# Patient Record
(demographics unavailable — no encounter records)

---

## 2024-10-28 NOTE — PHYSICAL EXAM
[General Appearance - Alert] : alert [General Appearance - In No Acute Distress] : in no acute distress [Oriented To Time, Place, And Person] : oriented to person, place, and time [Person] : oriented to person [Place] : oriented to place [Time] : oriented to time [Cranial Nerves Optic (II)] : visual acuity intact bilaterally,  pupils equal round and reactive to light [Cranial Nerves Oculomotor (III)] : extraocular motion intact [Cranial Nerves Trigeminal (V)] : facial sensation intact symmetrically [Cranial Nerves Facial (VII)] : face symmetrical [Cranial Nerves Vestibulocochlear (VIII)] : hearing was intact bilaterally [Cranial Nerves Glossopharyngeal (IX)] : tongue and palate midline [Cranial Nerves Accessory (XI - Cranial And Spinal)] : head turning and shoulder shrug symmetric [Cranial Nerves Hypoglossal (XII)] : there was no tongue deviation with protrusion [Motor Tone] : muscle tone was normal in all four extremities [Motor Strength] : muscle strength was normal in all four extremities [Abnormal Walk] : normal gait [Balance] : balance was intact [PERRL With Normal Accommodation] : pupils were equal in size, round, reactive to light, with normal accommodation [Extraocular Movements] : extraocular movements were intact [Affect] : the affect was normal

## 2024-10-29 NOTE — DATA REVIEWED
[de-identified] : Exam: [facility] NEURO IR PROCEDURE 10/18/24 Order#: [id number]    Patient: Abelardo Bello MR#: [id number] : 42 Hospital: [facility]  Pre-operative Diagnosis: Right cerebrovascular dural arteriovenous fistula.  Post-operative Diagnosis: 1. 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 There is additional arterial supply via right middle meningeal artery parietal and petrosquamosal branches, and possibly the right artery to the tentorium arising from the meningohypophyseal trunk. Venous drainage is into the right transverse sinus, transverse-sigmoid junction, and right sigmoid sinus. There is retrograde flow within the dural sinuses without overt evidence for cortical venous reflux. Extracranial venous drainage includes bilateral vertebral venous plexi and hypertrophied intramuscular veins within the suboccipital and posterior cervical musculature.  2. Mild right cervical internal carotid artery stenosis, approximately 32% by NASCET criteria.  PROCEDURE: 1. Diagnostic cerebral angiogram with bilateral common carotid, internal carotid, external carotid, right occipital artery injections. 2. Right common femoral angiogram for evaluation for placement of closure device.  REFERRING PHYSICIAN: Lyndon Britton M.D.  DATE OF SERVICE: 10/18/24  SURGEON: Po Donovan M.D.  ASSISTANT/S: Annie WILSON, George Montero M.D.  Clinical History: ?82 year old male presents with a single self limited episode of diplopia which resolved. Noninvasive vascular imaging studies suggest cerebrovascular dural arteriovenous fistula. Risks of diagnostic angiography including but not limited to bleeding, infection, vascular injury, life threatening hematoma, limb ischemia, renal failure, radiation exposure, stroke/coma/death, weakness/paralysis, visual/sensory loss, loss of speech/language/cognitive/memory function, changes in personality/behavior, failure to diagnose, need for additional procedures were discussed. Patient and family understand risks, benefits, alternatives, and wish to proceed.  Anesthesia: Local anesthesia with intravenous sedation. ? Medications: As per anesthesia.  Techniques and findings: Bilateral groins were prepped and draped in the usual sterile fashion. ?A timeout procedure was documented. The patient's name, date of birth, and medical record number as well as the procedures to be performed were confirmed by the entire team. After everyone in the room agreed, the procedure continued.  The right common femoral artery was accessed with a 4 [place] micropuncture needle after infiltration with local anesthesia. The needle was exchanged for a 5 [place] vascular sheath. The sheath was then connected to a continuous heparinized saline flush.  Diagnostic angiogram: Under fluoroscopic guidance, a 5 [place] GridCraft diagnostic catheter was advanced over a 0.035 inch angled glide guidewire through the abdominal and thoracic aorta into the right common carotid artery.  Right common carotid artery angiograms; cervical view Under fluoroscopic guidance, the catheter was advanced into the right common carotid artery. PA and lateral projections demonstrate antegrade flow into the right common, internal, and external carotid arteries. There is mild segmental stenosis of the proximal right internal carotid artery, distal to the carotid bifurcation, approximately 32% by NASCET criteria. The right cervical internal carotid artery is tortuous.  Right external carotid artery angiogram; cranial view Under fluoroscopic guidance, the catheter was advanced into the right external carotid artery. PA and lateral projections demonstrate a high flow right transverse and sigmoid sinus dural arteriovenous fistula with predominant arterial supply via branches of hypertrophied right occipital and right posterior auricular arteries. Additional arterial supply is noted from distal branches of the parietal and petrosquamosal branches of the right middle meningeal artery. The right superficial temporal artery is not well visualized, likely secondary to vascular steal, and appears late in the arterial phase. Venous drainage is into the right transverse sinus, transverse-sigmoid junction, and right sigmoid sinus. There is retrograde flow within the dural sinuses without overt evidence for cortical venous reflux. Extracranial venous drainage includes bilateral vertebral venous plexi and hypertrophied intramuscular veins within the suboccipital and posterior cervical musculature.  Right occipital artery angiogram; cranial view Under fluoroscopic guidance, the catheter was advanced into the right occipital artery. PA and lateral projections demonstrate the high flow right transverse and sigmoid sinus dural arteriovenous fistula with significant contribution via a hypertrophied occipital artery. Venous drainage is into the right transverse sinus, transverse-sigmoid junction, and right sigmoid sinus. There is retrograde flow within the dural sinuses without overt evidence for cortical venous reflux. Extracranial venous drainage includes bilateral vertebral venous plexi and hypertrophied intramuscular veins within the suboccipital and posterior cervical musculature.  Right internal carotid artery angiogram; cranial view Under fluoroscopic guidance, the catheter was advanced into the right internal carotid artery. PA and lateral projections demonstrate antegrade flow into the intracranial segments of the right i nternal carotid, right middle cerebral, and right anterior cerebral arteries. The right ophthalmic, posterior communicating, and anterior choroidal arteries are visualized. There is no significant cross filling of the contralateral anterior cerebral artery. Capillary phase is unremarkable. Dural venous sinuses are patent. There may be additional modest arterial contribution to the dural arteriovenous fistula via the artery of the tentorium arising from the meningohypophyseal trunk.  Left common carotid artery angiograms; cervical view Under fluoroscopic guidance, the catheter was advanced into the left common carotid artery. PA and lateral projections demonstrate antegrade flow into the left common and internal carotid arteries and the left external carotid artery. There is no evidence for significant atheromatous disease or hemodynamically significant stenosis. The left cervical internal carotid artery is tortuous.  Left external carotid artery angiogram; cranial view Under fluoroscopic guidance, the catheter was advanced into the left external carotid artery. PA and lateral projections demonstrate contribution to the dural arteriovenous fistula from a hypertrophied left occipital artery with venous drainage from this injection into the proximal right transverse sinus. Distal venous drainage patterns are otherwise not well visualized from this injection due to competitive flow and are described above.  Left internal carotid artery angiogram; cranial view Under fluoroscopic guidance, the catheter was advanced into the left internal carotid artery. PA and lateral projections demonstrate antegrade flow into the intracranial segments of the left internal carotid, middle cerebral, and anterior cerebral arteries. The left ophthalmic, posterior communicating, and anterior choroidal arteries are visualized. There is cross filling of the contralateral anterior cerebral artery via a patent anterior communicating artery. No significant contribution to the fistula is noted from this injection.  Left vertebral artery angiogram; cranial view Under fluoroscopic guidance, the catheter was advanced into the left vertebral artery. PA and lateral projections of the cranium demonstrate antegrade flow into the left vertebral and basilar arteries. The bilateral posterior cerebral and superior cerebellar vessels fill normally. The bilateral AICA vessels fill normally. There is no significant retrograde filling of the right vertebral artery. Bilateral PICA vessels fill normally. There is additional contribution to the dural arteriovenous fistula noted from a hypertrophied left posterior meningeal artery draining into the proximal right transverse sinus. Distal venous drainage patterns are otherwise once again not well visualized from this injection due to competitive flow and are described above. Capillary phase is otherwise unremarkable. Dural venous sinuses are otherwise patent.  Right vertebral artery angiogram; cranial view Under fluoroscopic guidance, the catheter was advanced into the right vertebral artery. PA and lateral projections of the cranium demonstrate antegrade flow into the right vertebral and basilar arteries. The bilateral posterior cerebral and superior cerebellar vessels fill normally. The bilateral AICA vessels fill normally. There is no significant retrograde filling of the left vertebral artery. Bilateral PICA vessels fill normally. There is additional contribution to the dural arteriovenous fistula noted from a hypertrophied right posterior meningeal artery draining into the proximal right transverse sinus. Distal venous drainage patterns are otherwise once again not well visualized from this injection due to competitive flow and are described above. Capillary phase is otherwise unremarkable. Dural venous sinuses are otherwise patent.  Right common femoral artery angiogram for evaluation for placement of closure device Right common femoral artery is visualized. The puncture site is in the superficial femoral artery just distal to the common femoral artery bifurcation. Both the superficial and the deep femoral artery are visualized with anterograde flow. There is no evidence for dissection or occlusion, proximal or distal to the site of puncture.  CONCLUSION/HEMOSTASIS: The diagnostic catheter was removed. The 5 [place] sheath was exchanged for a 5 [place] Vascade extraluminal closure device supplemented with manual compression to achieve hemostasis.  COMPLICATIONS: The patient tolerated the procedure well. There were no complications during the procedure.   IMPRESSION: 1. 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 There is additional arterial supply via right middle meningeal artery parietal and petrosquamosal branches, and possibly the right artery to the tentorium arising from the meningohypophyseal trunk. Venous drainage is into the right transverse sinus, transverse-sigmoid junction, and right sigmoid sinus. There is retrograde flow within the dural sinuses without overt evidence for cortical venous reflux. Extracranial venous drainage includes bilateral vertebral venous plexi and hypertrophied intramuscular veins within the suboccipital and posterior cervical musculature.  2. Mild right cervical internal carotid artery stenosis, approximately 32% by NASCET criteria.

## 2024-10-29 NOTE — ASSESSMENT
[FreeTextEntry1] : JOSH NARANJO is a 82 year male status post diagnostic cerebral angiogram which I independently performed and reviewed and demonstrates a right transverse and sigmoid sinus cerebrovascular dural arteriovenous fistula ([place] type I,Cognard type IIa) with arterial supply via branches of hypertrophied bilateral occipital, right posterior auricular, bilateral posterior meningeal arteries. There is additional arterial supply via right middle meningeal artery parietal and petrosquamosal branches, and possibly the right artery to the tentorium arising from the meningohypophyseal trunk. Venous drainage is into the right transverse sinus, transverse-sigmoid junction, and right sigmoid sinus. There is retrograde flow within the dural sinuses without overt evidence for cortical venous reflux. Extracranial venous drainage includes bilateral vertebral venous plexi and hypertrophied intramuscular veins within the suboccipital and posterior cervical musculature.  There is also a mild right cervical internal carotid artery stenosis, approximately 32% by NASCET criteria.  Natural history discussed in detail. Alternative management strategies reviewed. Consensus cerebrovascular team recommendation is to monitor with surveillance imaging.   I have recommended MRI brain with and without gadolinium to include a frameless protocol (thin contiguous slices T1 with yamil) in 6 months with an appointment to follow. We did discuss the possibility of also proceeding with diagnostic angiography under general anesthesia following the new MRI, certainly if there are changes reflecting venous hypertension, but even in the event that the MRI remains stable. This would afford us the ability to obtain more precise imaging and to be prepared to treat with targeted endovascular embolization if deemed indicated, safe, and feasible at the time of the procedure. We will obtain the MRI in March of 2025 (6 month follow up) with Telehealth appointment to follow, and discuss the relative pros and cons of follow up diagnostic cerebral angiography under general anesthesia at the time of the Telehealth follow up appointment. The MRI brain with and without gadolinium to include frameless protocol can be obtained locally in [place], and we will provide the prescription for this, and then the patient will need to forward us the imaging on CD for review.   I have asked the patient and his wife to contact me for any symptomatic development or progression in the interim at which time we can obtain expedited follow up imaging.   A total of 45 minutes was spent relative to this encounter.

## 2024-10-29 NOTE — REASON FOR VISIT
[FreeTextEntry1] : JOSH NARANJO is a 82 year male seen in the office today in attendance with his wife status post diagnostic cerebral angiogram performed independently and reviewed by me on 10/18/24. He denies new neurological symptoms, groin pain, groin swelling, bleeding or bruising. Diagnostic cerebral angiogram detailed below.

## 2024-10-29 NOTE — ASSESSMENT
[FreeTextEntry1] : JOSH NARANJO is a 82 year male status post diagnostic cerebral angiogram which I independently performed and reviewed and demonstrates a right transverse and sigmoid sinus cerebrovascular dural arteriovenous fistula ([place] type I,Cognard type IIa) with arterial supply via branches of hypertrophied bilateral occipital, right posterior auricular, bilateral posterior meningeal arteries. There is additional arterial supply via right middle meningeal artery parietal and petrosquamosal branches, and possibly the right artery to the tentorium arising from the meningohypophyseal trunk. Venous drainage is into the right transverse sinus, transverse-sigmoid junction, and right sigmoid sinus. There is retrograde flow within the dural sinuses without overt evidence for cortical venous reflux. Extracranial venous drainage includes bilateral vertebral venous plexi and hypertrophied intramuscular veins within the suboccipital and posterior cervical musculature.  There is also a mild right cervical internal carotid artery stenosis, approximately 32% by NASCET criteria.  Natural history discussed in detail. Alternative management strategies reviewed. Consensus cerebrovascular team recommendation is to monitor with surveillance imaging.   I have recommended MRI brain with and without gadolinium to include a frameless protocol (thin contiguous slices T1 with yamil) in 6 months with an appointment to follow. We did discuss the possibility of also proceeding with diagnostic angiography under general anesthesia following the new MRI, certainly if there are changes reflecting venous hypertension, but even in the event that the MRI remains stable. This would afford us the ability to obtain more precise imaging and to be prepared to treat with targeted endovascular embolization if deemed indicated, safe, and feasible at the time of the procedure. We will obtain the MRI in March of 2025 (6 month follow up) with Telehealth appointment to follow, and discuss the relative pros and cons of follow up diagnostic cerebral angiography under general anesthesia at the time of the Telehealth follow up appointment. The MRI brain with and without gadolinium to include frameless protocol can be obtained locally in [place], and we will provide the prescription for this, and then the patient will need to forward us the imaging on CD for review.   I have asked the patient and his wife to contact me for any symptomatic development or progression in the interim at which time we can obtain expedited follow up imaging.   A total of 45 minutes was spent relative to this encounter.

## 2024-10-29 NOTE — DATA REVIEWED
[de-identified] : Exam: CAP NEURO IR PROCEDURE 10/18/24 Order#: [id number]    Patient: Abelardo Bello MR#: [id number] : 42 Hospital: [facility]  Pre-operative Diagnosis: Right cerebrovascular dural arteriovenous fistula.  Post-operative Diagnosis: 1. 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 There is additional arterial supply via right middle meningeal artery parietal and petrosquamosal branches, and possibly the right artery to the tentorium arising from the meningohypophyseal trunk. Venous drainage is into the right transverse sinus, transverse-sigmoid junction, and right sigmoid sinus. There is retrograde flow within the dural sinuses without overt evidence for cortical venous reflux. Extracranial venous drainage includes bilateral vertebral venous plexi and hypertrophied intramuscular veins within the suboccipital and posterior cervical musculature.  2. Mild right cervical internal carotid artery stenosis, approximately 32% by NASCET criteria.  PROCEDURE: 1. Diagnostic cerebral angiogram with bilateral common carotid, internal carotid, external carotid, right occipital artery injections. 2. Right common femoral angiogram for evaluation for placement of closure device.  REFERRING PHYSICIAN: Lyndon Britton M.D.  DATE OF SERVICE: 10/18/24  SURGEON: Po Donovan M.D.  ASSISTANT/S: Annie WILSON, George Montero M.D.  Clinical History: ?82 year old male presents with a single self limited episode of diplopia which resolved. Noninvasive vascular imaging studies suggest cerebrovascular dural arteriovenous fistula. Risks of diagnostic angiography including but not limited to bleeding, infection, vascular injury, life threatening hematoma, limb ischemia, renal failure, radiation exposure, stroke/coma/death, weakness/paralysis, visual/sensory loss, loss of speech/language/cognitive/memory function, changes in personality/behavior, failure to diagnose, need for additional procedures were discussed. Patient and family understand risks, benefits, alternatives, and wish to proceed.  Anesthesia: Local anesthesia with intravenous sedation. ? Medications: As per anesthesia.  Techniques and findings: Bilateral groins were prepped and draped in the usual sterile fashion. ?A timeout procedure was documented. The patient's name, date of birth, and medical record number as well as the procedures to be performed were confirmed by the entire team. After everyone in the room agreed, the procedure continued.  The right common femoral artery was accessed with a 4 [place] micropuncture needle after infiltration with local anesthesia. The needle was exchanged for a 5 [place] vascular sheath. The sheath was then connected to a continuous heparinized saline flush.  Diagnostic angiogram: Under fluoroscopic guidance, a 5 [place] kompany diagnostic catheter was advanced over a 0.035 inch angled glide guidewire through the abdominal and thoracic aorta into the right common carotid artery.  Right common carotid artery angiograms; cervical view Under fluoroscopic guidance, the catheter was advanced into the right common carotid artery. PA and lateral projections demonstrate antegrade flow into the right common, internal, and external carotid arteries. There is mild segmental stenosis of the proximal right internal carotid artery, distal to the carotid bifurcation, approximately 32% by NASCET criteria. The right cervical internal carotid artery is tortuous.  Right external carotid artery angiogram; cranial view Under fluoroscopic guidance, the catheter was advanced into the right external carotid artery. PA and lateral projections demonstrate a high flow right transverse and sigmoid sinus dural arteriovenous fistula with predominant arterial supply via branches of hypertrophied right occipital and right posterior auricular arteries. Additional arterial supply is noted from distal branches of the parietal and petrosquamosal branches of the right middle meningeal artery. The right superficial temporal artery is not well visualized, likely secondary to vascular steal, and appears late in the arterial phase. Venous drainage is into the right transverse sinus, transverse-sigmoid junction, and right sigmoid sinus. There is retrograde flow within the dural sinuses without overt evidence for cortical venous reflux. Extracranial venous drainage includes bilateral vertebral venous plexi and hypertrophied intramuscular veins within the suboccipital and posterior cervical musculature.  Right occipital artery angiogram; cranial view Under fluoroscopic guidance, the catheter was advanced into the right occipital artery. PA and lateral projections demonstrate the high flow right transverse and sigmoid sinus dural arteriovenous fistula with significant contribution via a hypertrophied occipital artery. Venous drainage is into the right transverse sinus, transverse-sigmoid junction, and right sigmoid sinus. There is retrograde flow within the dural sinuses without overt evidence for cortical venous reflux. Extracranial venous drainage includes bilateral vertebral venous plexi and hypertrophied intramuscular veins within the suboccipital and posterior cervical musculature.  Right internal carotid artery angiogram; cranial view Under fluoroscopic guidance, the catheter was advanced into the right internal carotid artery. PA and lateral projections demonstrate antegrade flow into the intracranial segments of the right i nternal carotid, right middle cerebral, and right anterior cerebral arteries. The right ophthalmic, posterior communicating, and anterior choroidal arteries are visualized. There is no significant cross filling of the contralateral anterior cerebral artery. Capillary phase is unremarkable. Dural venous sinuses are patent. There may be additional modest arterial contribution to the dural arteriovenous fistula via the artery of the tentorium arising from the meningohypophyseal trunk.  Left common carotid artery angiograms; cervical view Under fluoroscopic guidance, the catheter was advanced into the left common carotid artery. PA and lateral projections demonstrate antegrade flow into the left common and internal carotid arteries and the left external carotid artery. There is no evidence for significant atheromatous disease or hemodynamically significant stenosis. The left cervical internal carotid artery is tortuous.  Left external carotid artery angiogram; cranial view Under fluoroscopic guidance, the catheter was advanced into the left external carotid artery. PA and lateral projections demonstrate contribution to the dural arteriovenous fistula from a hypertrophied left occipital artery with venous drainage from this injection into the proximal right transverse sinus. Distal venous drainage patterns are otherwise not well visualized from this injection due to competitive flow and are described above.  Left internal carotid artery angiogram; cranial view Under fluoroscopic guidance, the catheter was advanced into the left internal carotid artery. PA and lateral projections demonstrate antegrade flow into the intracranial segments of the left internal carotid, middle cerebral, and anterior cerebral arteries. The left ophthalmic, posterior communicating, and anterior choroidal arteries are visualized. There is cross filling of the contralateral anterior cerebral artery via a patent anterior communicating artery. No significant contribution to the fistula is noted from this injection.  Left vertebral artery angiogram; cranial view Under fluoroscopic guidance, the catheter was advanced into the left vertebral artery. PA and lateral projections of the cranium demonstrate antegrade flow into the left vertebral and basilar arteries. The bilateral posterior cerebral and superior cerebellar vessels fill normally. The bilateral AICA vessels fill normally. There is no significant retrograde filling of the right vertebral artery. Bilateral PICA vessels fill normally. There is additional contribution to the dural arteriovenous fistula noted from a hypertrophied left posterior meningeal artery draining into the proximal right transverse sinus. Distal venous drainage patterns are otherwise once again not well visualized from this injection due to competitive flow and are described above. Capillary phase is otherwise unremarkable. Dural venous sinuses are otherwise patent.  Right vertebral artery angiogram; cranial view Under fluoroscopic guidance, the catheter was advanced into the right vertebral artery. PA and lateral projections of the cranium demonstrate antegrade flow into the right vertebral and basilar arteries. The bilateral posterior cerebral and superior cerebellar vessels fill normally. The bilateral AICA vessels fill normally. There is no significant retrograde filling of the left vertebral artery. Bilateral PICA vessels fill normally. There is additional contribution to the dural arteriovenous fistula noted from a hypertrophied right posterior meningeal artery draining into the proximal right transverse sinus. Distal venous drainage patterns are otherwise once again not well visualized from this injection due to competitive flow and are described above. Capillary phase is otherwise unremarkable. Dural venous sinuses are otherwise patent.  Right common femoral artery angiogram for evaluation for placement of closure device Right common femoral artery is visualized. The puncture site is in the superficial femoral artery just distal to the common femoral artery bifurcation. Both the superficial and the deep femoral artery are visualized with anterograde flow. There is no evidence for dissection or occlusion, proximal or distal to the site of puncture.  CONCLUSION/HEMOSTASIS: The diagnostic catheter was removed. The 5 [place] sheath was exchanged for a 5 [place] Vascade extraluminal closure device supplemented with manual compression to achieve hemostasis.  COMPLICATIONS: The patient tolerated the procedure well. There were no complications during the procedure.   IMPRESSION: 1. 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 There is additional arterial supply via right middle meningeal artery parietal and petrosquamosal branches, and possibly the right artery to the tentorium arising from the meningohypophyseal trunk. Venous drainage is into the right transverse sinus, transverse-sigmoid junction, and right sigmoid sinus. There is retrograde flow within the dural sinuses without overt evidence for cortical venous reflux. Extracranial venous drainage includes bilateral vertebral venous plexi and hypertrophied intramuscular veins within the suboccipital and posterior cervical musculature.  2. Mild right cervical internal carotid artery stenosis, approximately 32% by NASCET criteria.

## 2025-04-01 NOTE — ASSESSMENT
[FreeTextEntry1] : JOSH NARANJO is a 82 year male with a right transverse and sigmoid sinus cerebrovascular dural arteriovenous fistula (Harford type I,Cognard type IIa) with arterial supply via branches of hypertrophied bilateral occipital, right posterior auricular, bilateral posterior meningeal arteries, right middle meningeal artery parietal and petrosquamosal branches, and likely right artery of the tentorium.  I personally reviewed and interpreted his latest MRI brain with and without gadolinium from 2/20/2025 which demonstrates no interval change when compared to MRI brain from 9/18/24. Specifically, there is no overt evidence for interval development of changes that would reflect venous hypertension.   Natural history once again discussed in detail. Alternative management strategies reviewed.   We again discussed proceeding with diagnostic angiography under general anesthesia to treat with targeted endovascular embolization.  Risks including but not limited to bleeding, infection, vascular injury, life threatening hematoma, limb ischemia, need for surgical repair, renal failure, stroke, brain hemorrhage, coma/death, weakness/paralysis, visual/sensory loss, loss of speech/language/cognitive/memory function, changes in personality/behavior, failure of procedure, recurrence, need for additional procedures discussed.  and Mrs. Naranjo  understand the risks and benefits and they are considering this option. If he decides to proceed we will schedule him for diagnostic cerebral angiogram for endovascular embolization of right transverse and sigmoid sinus cerebrovascular dural arteriovenous fistula (Harford type I,Cognard type IIa).  If he decides not to proceed, then I have recommended MRI brain with and without gadolinium to include a frameless protocol (thin contiguous slices T1 with yamil) in 6 months with an appointment to follow. He is leaning towards surveillance imaging at this time.  I have asked the patient and his wife to contact me for any symptomatic development or progression in the interim at which time we can obtain expedited follow up imaging.  A total of 45 minutes was spent relative to this encounter.

## 2025-04-01 NOTE — ASSESSMENT
[FreeTextEntry1] : JOSH NARANJO is a 82 year male with a right transverse and sigmoid sinus cerebrovascular dural arteriovenous fistula (Mayo type I,Cognard type IIa) with arterial supply via branches of hypertrophied bilateral occipital, right posterior auricular, bilateral posterior meningeal arteries, right middle meningeal artery parietal and petrosquamosal branches, and likely right artery of the tentorium.  I personally reviewed and interpreted his latest MRI brain with and without gadolinium from 2/20/2025 which demonstrates no interval change when compared to MRI brain from 9/18/24. Specifically, there is no overt evidence for interval development of changes that would reflect venous hypertension.   Natural history once again discussed in detail. Alternative management strategies reviewed.   We again discussed proceeding with diagnostic angiography under general anesthesia to treat with targeted endovascular embolization.  Risks including but not limited to bleeding, infection, vascular injury, life threatening hematoma, limb ischemia, need for surgical repair, renal failure, stroke, brain hemorrhage, coma/death, weakness/paralysis, visual/sensory loss, loss of speech/language/cognitive/memory function, changes in personality/behavior, failure of procedure, recurrence, need for additional procedures discussed.  and Mrs. Naranjo  understand the risks and benefits and they are considering this option. If he decides to proceed we will schedule him for diagnostic cerebral angiogram for endovascular embolization of right transverse and sigmoid sinus cerebrovascular dural arteriovenous fistula (Mayo type I,Cognard type IIa).  If he decides not to proceed, then I have recommended MRI brain with and without gadolinium to include a frameless protocol (thin contiguous slices T1 with yamil) in 6 months with an appointment to follow. He is leaning towards surveillance imaging at this time.  I have asked the patient and his wife to contact me for any symptomatic development or progression in the interim at which time we can obtain expedited follow up imaging.  A total of 45 minutes was spent relative to this encounter.

## 2025-04-01 NOTE — REASON FOR VISIT
[FreeTextEntry1] : The patient has given verbal consent for this telehealth visit using two-way audio and video technology.  The patient is currently located at home [address] [place] [address], and I am located at my office at [facility]. The patient does not have the capability for video technology at this time.   JOSH NARANJO returns today with his wife in attendance for interval follow up and imaging review of his right transverse and sigmoid sinus cerebrovascular dural arteriovenous fistula (Alex type I,Cognard type IIa). The patient denies new neurological symptoms. Imaging obtained reviewed and detailed below.

## 2025-04-01 NOTE — ASSESSMENT
[FreeTextEntry1] : JOSH NARANJO is a 82 year male with a right transverse and sigmoid sinus cerebrovascular dural arteriovenous fistula ([place] type I,Cognard type IIa) with arterial supply via branches of hypertrophied bilateral occipital, right posterior auricular, bilateral posterior meningeal arteries, right middle meningeal artery parietal and petrosquamosal branches, and likely right artery of the tentorium.  I personally reviewed and interpreted his latest MRI brain with and without gadolinium from 2/20/2025 which demonstrates no interval change when compared to MRI brain from 9/18/24. Specifically, there is no overt evidence for interval development of changes that would reflect venous hypertension.   Natural history once again discussed in detail. Alternative management strategies reviewed.   We again discussed proceeding with diagnostic angiography under general anesthesia to treat with targeted endovascular embolization.  Risks including but not limited to bleeding, infection, vascular injury, life threatening hematoma, limb ischemia, need for surgical repair, renal failure, stroke, brain hemorrhage, coma/death, weakness/paralysis, visual/sensory loss, loss of speech/language/cognitive/memory function, changes in personality/behavior, failure of procedure, recurrence, need for additional procedures discussed.  and Mrs. Naranjo  understand the risks and benefits and they are considering this option. If he decides to proceed we will schedule him for diagnostic cerebral angiogram for endovascular embolization of right transverse and sigmoid sinus cerebrovascular dural arteriovenous fistula ([place] type I,Cognard type IIa).  If he decides not to proceed, then I have recommended MRI brain with and without gadolinium to include a frameless protocol (thin contiguous slices T1 with yamil) in 6 months with an appointment to follow. He is leaning towards surveillance imaging at this time.  I have asked the patient and his wife to contact me for any symptomatic development or progression in the interim at which time we can obtain expedited follow up imaging.  A total of 45 minutes was spent relative to this encounter.

## 2025-04-01 NOTE — REASON FOR VISIT
[FreeTextEntry1] : The patient has given verbal consent for this telehealth visit using two-way audio and video technology.  The patient is currently located at home [address] [place] [address], and I am located at my office at [facility]. The patient does not have the capability for video technology at this time.   JOSH NARANJO returns today with his wife in attendance for interval follow up and imaging review of his right transverse and sigmoid sinus cerebrovascular dural arteriovenous fistula (Alex type I,Cognard type IIa). The patient denies new neurological symptoms. Imaging obtained reviewed and detailed below.

## 2025-04-01 NOTE — REASON FOR VISIT
[FreeTextEntry1] : The patient has given verbal consent for this telehealth visit using two-way audio and video technology.  The patient is currently located at home [address] [place] [address], and I am located at my office at [facility]. The patient does not have the capability for video technology at this time.   JOSH NARANJO returns today with his wife in attendance for interval follow up and imaging review of his right transverse and sigmoid sinus cerebrovascular dural arteriovenous fistula (Alex type I,Cognard type IIa). The patient denies new neurological symptoms. Imaging obtained reviewed and detailed below.